# Patient Record
Sex: FEMALE | Race: WHITE | NOT HISPANIC OR LATINO
[De-identification: names, ages, dates, MRNs, and addresses within clinical notes are randomized per-mention and may not be internally consistent; named-entity substitution may affect disease eponyms.]

---

## 2020-11-11 ENCOUNTER — TRANSCRIPTION ENCOUNTER (OUTPATIENT)
Age: 55
End: 2020-11-11

## 2023-11-20 PROBLEM — Z00.00 ENCOUNTER FOR PREVENTIVE HEALTH EXAMINATION: Status: ACTIVE | Noted: 2023-11-20

## 2023-11-27 ENCOUNTER — APPOINTMENT (OUTPATIENT)
Dept: ORTHOPEDIC SURGERY | Facility: CLINIC | Age: 58
End: 2023-11-27
Payer: COMMERCIAL

## 2023-11-27 VITALS — WEIGHT: 145 LBS | BODY MASS INDEX: 20.76 KG/M2 | HEIGHT: 70 IN

## 2023-11-27 DIAGNOSIS — F32.A ANXIETY DISORDER, UNSPECIFIED: ICD-10-CM

## 2023-11-27 DIAGNOSIS — F41.9 ANXIETY DISORDER, UNSPECIFIED: ICD-10-CM

## 2023-11-27 DIAGNOSIS — R93.7 ABNORMAL FINDINGS ON DIAGNOSTIC IMAGING OF OTHER PARTS OF MUSCULOSKELETAL SYSTEM: ICD-10-CM

## 2023-11-27 DIAGNOSIS — F17.200 NICOTINE DEPENDENCE, UNSPECIFIED, UNCOMPLICATED: ICD-10-CM

## 2023-11-27 DIAGNOSIS — E78.00 PURE HYPERCHOLESTEROLEMIA, UNSPECIFIED: ICD-10-CM

## 2023-11-27 PROCEDURE — 99204 OFFICE O/P NEW MOD 45 MIN: CPT

## 2023-11-27 RX ORDER — ESCITALOPRAM OXALATE 5 MG/1
TABLET, FILM COATED ORAL
Refills: 0 | Status: ACTIVE | COMMUNITY

## 2023-11-27 RX ORDER — METHYLPREDNISOLONE 4 MG/1
4 TABLET ORAL
Qty: 1 | Refills: 0 | Status: ACTIVE | COMMUNITY
Start: 2023-11-27 | End: 1900-01-01

## 2023-11-27 RX ORDER — BUPROPION HYDROCHLORIDE 100 MG/1
TABLET, FILM COATED ORAL
Refills: 0 | Status: ACTIVE | COMMUNITY

## 2023-11-27 RX ORDER — ROSUVASTATIN CALCIUM 5 MG/1
TABLET, FILM COATED ORAL
Refills: 0 | Status: ACTIVE | COMMUNITY

## 2024-01-08 ENCOUNTER — APPOINTMENT (OUTPATIENT)
Dept: ORTHOPEDIC SURGERY | Facility: CLINIC | Age: 59
End: 2024-01-08
Payer: COMMERCIAL

## 2024-01-08 PROCEDURE — 99214 OFFICE O/P EST MOD 30 MIN: CPT

## 2024-02-14 ENCOUNTER — OFFICE (OUTPATIENT)
Dept: URBAN - METROPOLITAN AREA CLINIC 8 | Facility: CLINIC | Age: 59
Setting detail: OPHTHALMOLOGY
End: 2024-02-14
Payer: COMMERCIAL

## 2024-02-14 DIAGNOSIS — H25.13: ICD-10-CM

## 2024-02-14 PROCEDURE — 99204 OFFICE O/P NEW MOD 45 MIN: CPT | Performed by: OPHTHALMOLOGY

## 2024-02-14 ASSESSMENT — CONFRONTATIONAL VISUAL FIELD TEST (CVF)
OD_FINDINGS: FULL
OS_FINDINGS: FULL

## 2024-02-14 ASSESSMENT — REFRACTION_MANIFEST
OS_VA1: 20/20
OS_ADD: +2.50
OD_CYLINDER: -0.50
OD_SPHERE: -3.00
OD_VA1: 20/50
OD_ADD: +2.50
OS_VA2: 20/25
OD_AXIS: 095
OS_SPHERE: PLANO
OD_VA2: 20/25

## 2024-02-14 ASSESSMENT — REFRACTION_AUTOREFRACTION
OS_SPHERE: +0.50
OD_AXIS: 097
OD_SPHERE: -4.25
OD_CYLINDER: -0.50
OS_CYLINDER: -0.25
OS_AXIS: 061

## 2024-02-14 ASSESSMENT — REFRACTION_CURRENTRX
OD_SPHERE: +0.25
OS_OVR_VA: 20/
OD_CYLINDER: -1.00
OD_VPRISM_DIRECTION: SV
OD_AXIS: 097
OS_SPHERE: +2.25
OD_OVR_VA: 20/
OS_CYLINDER: SPHERE

## 2024-02-14 ASSESSMENT — SPHEQUIV_DERIVED
OD_SPHEQUIV: -4.5
OD_SPHEQUIV: -3.25
OS_SPHEQUIV: 0.375

## 2024-02-26 ENCOUNTER — APPOINTMENT (OUTPATIENT)
Dept: ORTHOPEDIC SURGERY | Facility: CLINIC | Age: 59
End: 2024-02-26
Payer: COMMERCIAL

## 2024-02-26 DIAGNOSIS — M51.37 OTHER INTERVERTEBRAL DISC DEGENERATION, LUMBOSACRAL REGION: ICD-10-CM

## 2024-02-26 DIAGNOSIS — M54.16 RADICULOPATHY, LUMBAR REGION: ICD-10-CM

## 2024-02-26 DIAGNOSIS — M51.36 OTHER INTERVERTEBRAL DISC DEGENERATION, LUMBAR REGION: ICD-10-CM

## 2024-02-26 DIAGNOSIS — M47.817 SPONDYLOSIS W/OUT MYELOPATHY OR RADICULOPATHY, LUMBOSACRAL REGION: ICD-10-CM

## 2024-02-26 DIAGNOSIS — M48.061 SPINAL STENOSIS, LUMBAR REGION WITHOUT NEUROGENIC CLAUDICATION: ICD-10-CM

## 2024-02-26 PROCEDURE — 99213 OFFICE O/P EST LOW 20 MIN: CPT

## 2024-02-26 NOTE — PHYSICAL EXAM
[TextEntry] : Constitutional: Well groomed and developed.  Respiratory: Normal, unlabored breathing. No use of accessory muscles.  Skin: No rashes or ulcers. Skin warm and dry.  Psychiatric: Oriented to time, place, person and event. No acute distress. Gait: Heel to toe Patient able to walk on toes and heels.    Lumbar spine:  Posture: Normal on coronal and sagittal alignment  AROM:  Flexion 90 Extension 20 Mild pain with simulated truncal motion   Tenderness:  Thoracic: No tenderness on palpation  Lumbar: Mild tenderness on palpation      TTP L4-5 Sacrum/coccyx: no tenderness on palpation  Greater trochanteric bursa:  No tenderness  PSIS: None    Motor:                         R             L LE:                                IS                    5             5 Quad              5             5 TA                  5             5 EHL                5             5 Gastroc         5             5                 R  L DTR: Patella  2+  2+ Achilles  2+  2+  Sensory: Light touch sensation intact T12-S1  Babinski's Sign: Negative bilaterally  Straight leg raise test: Negative bilaterally  ANT test: negative bilaterally

## 2024-02-26 NOTE — ASSESSMENT
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 Patient has TIMMY with Dr. Kwok on 1/18 with good relief. Discussed with patient we may watch and wait to see how long injection lasts for. Continue with home stretching.    - Patient will continue HEP as detailed in home exercise booklet given in office/at PT. Emphasized daily stretching and strengthening.   - Recommend hematology consult for diffuse marrow edema  - Recommend NSAIDs PRN - Recommend heating pad use to decrease muscle spasm - Discussed the importance of home exercises, including but not limited to hamstring stretching and core strengthening   Patient was educated on their diagnosis today. All questions answered and patient expressed understanding.   Follow up PRN

## 2024-02-26 NOTE — HISTORY OF PRESENT ILLNESS
[Lower back] : lower back [Sudden] : sudden [5] : 5 [3] : 3 [Dull/Aching] : dull/aching [Sharp] : sharp [Tightness] : tightness [Squeezing] : squeezing [Constant] : constant [Meds] : meds [Bending forward] : bending forward [de-identified] : Follow up lumbar spine. Patient had TIMMY on 1/18/24 with Dr. Kwok and feels the severity of her pain has decreased. Patient admits to going to PT until her insurance cut her off. Patient admits to working with a . Patient admits to D/C Gabapentin. Patient admits to taking Advil for pain.  Today's Pain 3/10 [FreeTextEntry7] : into the right hip, groin, and RLQ [] : no [de-identified] : raising her right leg

## 2024-03-18 ENCOUNTER — OFFICE (OUTPATIENT)
Dept: URBAN - METROPOLITAN AREA CLINIC 38 | Facility: CLINIC | Age: 59
Setting detail: OPHTHALMOLOGY
End: 2024-03-18
Payer: COMMERCIAL

## 2024-03-18 DIAGNOSIS — H25.13: ICD-10-CM

## 2024-03-18 DIAGNOSIS — H25.11: ICD-10-CM

## 2024-03-18 PROCEDURE — 99213 OFFICE O/P EST LOW 20 MIN: CPT | Performed by: OPHTHALMOLOGY

## 2024-03-18 PROCEDURE — 92136 OPHTHALMIC BIOMETRY: CPT | Performed by: OPHTHALMOLOGY

## 2024-03-18 ASSESSMENT — REFRACTION_MANIFEST
OD_VA2: 20/25
OD_CYLINDER: -0.50
OD_VA1: 20/50
OS_ADD: +2.50
OD_ADD: +2.50
OS_SPHERE: PLANO
OD_AXIS: 095
OD_SPHERE: -3.00
OS_VA1: 20/20
OS_VA2: 20/25

## 2024-03-18 ASSESSMENT — REFRACTION_CURRENTRX
OS_SPHERE: +2.25
OS_OVR_VA: 20/
OD_OVR_VA: 20/
OD_AXIS: 097
OD_CYLINDER: -1.00
OD_SPHERE: +0.25
OS_CYLINDER: SPHERE
OD_VPRISM_DIRECTION: SV

## 2024-03-18 ASSESSMENT — SPHEQUIV_DERIVED: OD_SPHEQUIV: -3.25

## 2024-04-02 ENCOUNTER — OUTPATIENT HOSPITAL (OUTPATIENT)
Dept: URBAN - METROPOLITAN AREA CLINIC 7 | Facility: CLINIC | Age: 59
Setting detail: OPHTHALMOLOGY
End: 2024-04-02
Payer: COMMERCIAL

## 2024-04-02 DIAGNOSIS — H25.11: ICD-10-CM

## 2024-04-02 PROCEDURE — 66984 XCAPSL CTRC RMVL W/O ECP: CPT | Mod: RT | Performed by: OPHTHALMOLOGY

## 2024-04-03 ENCOUNTER — OFFICE (OUTPATIENT)
Dept: URBAN - METROPOLITAN AREA CLINIC 8 | Facility: CLINIC | Age: 59
Setting detail: OPHTHALMOLOGY
End: 2024-04-03
Payer: COMMERCIAL

## 2024-04-03 ENCOUNTER — RX ONLY (RX ONLY)
Age: 59
End: 2024-04-03

## 2024-04-03 DIAGNOSIS — Z96.1: ICD-10-CM

## 2024-04-03 PROCEDURE — 99024 POSTOP FOLLOW-UP VISIT: CPT | Performed by: OPHTHALMOLOGY

## 2024-05-20 ENCOUNTER — OFFICE (OUTPATIENT)
Dept: URBAN - METROPOLITAN AREA CLINIC 8 | Facility: CLINIC | Age: 59
Setting detail: OPHTHALMOLOGY
End: 2024-05-20
Payer: COMMERCIAL

## 2024-05-20 DIAGNOSIS — H25.12: ICD-10-CM

## 2024-05-20 DIAGNOSIS — Z96.1: ICD-10-CM

## 2024-05-20 PROCEDURE — 99024 POSTOP FOLLOW-UP VISIT: CPT | Performed by: OPHTHALMOLOGY

## 2024-05-20 ASSESSMENT — CONFRONTATIONAL VISUAL FIELD TEST (CVF)
OD_FINDINGS: FULL
OS_FINDINGS: FULL

## 2024-06-10 ENCOUNTER — RX ONLY (RX ONLY)
Age: 59
End: 2024-06-10

## 2024-06-10 ENCOUNTER — OFFICE (OUTPATIENT)
Dept: URBAN - METROPOLITAN AREA CLINIC 8 | Facility: CLINIC | Age: 59
Setting detail: OPHTHALMOLOGY
End: 2024-06-10
Payer: COMMERCIAL

## 2024-06-10 DIAGNOSIS — H25.12: ICD-10-CM

## 2024-06-10 PROCEDURE — 92136 OPHTHALMIC BIOMETRY: CPT | Performed by: OPHTHALMOLOGY

## 2024-06-10 ASSESSMENT — CONFRONTATIONAL VISUAL FIELD TEST (CVF)
OD_FINDINGS: FULL
OS_FINDINGS: FULL

## 2024-06-18 ENCOUNTER — OUTPATIENT HOSPITAL (OUTPATIENT)
Dept: URBAN - METROPOLITAN AREA CLINIC 7 | Facility: CLINIC | Age: 59
Setting detail: OPHTHALMOLOGY
End: 2024-06-18
Payer: COMMERCIAL

## 2024-06-18 DIAGNOSIS — H25.12: ICD-10-CM

## 2024-06-18 PROCEDURE — 66984 XCAPSL CTRC RMVL W/O ECP: CPT | Mod: 79,LT | Performed by: OPHTHALMOLOGY

## 2024-06-19 ENCOUNTER — RX ONLY (RX ONLY)
Age: 59
End: 2024-06-19

## 2024-06-19 ENCOUNTER — OFFICE (OUTPATIENT)
Dept: URBAN - METROPOLITAN AREA CLINIC 8 | Facility: CLINIC | Age: 59
Setting detail: OPHTHALMOLOGY
End: 2024-06-19
Payer: COMMERCIAL

## 2024-06-19 DIAGNOSIS — Z96.1: ICD-10-CM

## 2024-06-19 PROCEDURE — 99024 POSTOP FOLLOW-UP VISIT: CPT | Performed by: OPHTHALMOLOGY

## 2024-06-19 ASSESSMENT — CONFRONTATIONAL VISUAL FIELD TEST (CVF)
OD_FINDINGS: FULL
OS_FINDINGS: FULL

## 2024-07-23 ENCOUNTER — OFFICE (OUTPATIENT)
Dept: URBAN - METROPOLITAN AREA CLINIC 38 | Facility: CLINIC | Age: 59
Setting detail: OPHTHALMOLOGY
End: 2024-07-23
Payer: COMMERCIAL

## 2024-07-23 DIAGNOSIS — H02.011: ICD-10-CM

## 2024-07-23 DIAGNOSIS — Z96.1: ICD-10-CM

## 2024-07-23 DIAGNOSIS — H43.393: ICD-10-CM

## 2024-07-23 PROCEDURE — 67820 REVISE EYELASHES: CPT | Mod: 79,E3 | Performed by: OPTOMETRIST

## 2024-07-23 PROCEDURE — 99024 POSTOP FOLLOW-UP VISIT: CPT | Mod: 24 | Performed by: OPTOMETRIST

## 2024-07-23 ASSESSMENT — LID EXAM ASSESSMENTS
OD_TRICHIASIS: RUL T
OD_COMMENTS: 1 LASH EPILATED AT SLIT LAMP WITH JEWELER&APOS

## 2024-07-23 ASSESSMENT — CONFRONTATIONAL VISUAL FIELD TEST (CVF)
OS_FINDINGS: FULL
OD_FINDINGS: FULL

## 2024-09-05 ENCOUNTER — OFFICE (OUTPATIENT)
Dept: URBAN - METROPOLITAN AREA CLINIC 38 | Facility: CLINIC | Age: 59
Setting detail: OPHTHALMOLOGY
End: 2024-09-05
Payer: COMMERCIAL

## 2024-09-05 DIAGNOSIS — Z96.1: ICD-10-CM

## 2024-09-05 DIAGNOSIS — H02.012: ICD-10-CM

## 2024-09-05 DIAGNOSIS — H02.011: ICD-10-CM

## 2024-09-05 DIAGNOSIS — H16.223: ICD-10-CM

## 2024-09-05 DIAGNOSIS — H02.015: ICD-10-CM

## 2024-09-05 PROCEDURE — 99024 POSTOP FOLLOW-UP VISIT: CPT | Performed by: OPTOMETRIST

## 2024-09-05 ASSESSMENT — CONFRONTATIONAL VISUAL FIELD TEST (CVF)
OD_FINDINGS: FULL
OS_FINDINGS: FULL

## 2024-09-05 ASSESSMENT — LID EXAM ASSESSMENTS
OS_TRICHIASIS: LLL 1+
OD_TRICHIASIS: RLL RUL T 1+

## 2024-12-19 ENCOUNTER — OFFICE (OUTPATIENT)
Dept: URBAN - METROPOLITAN AREA CLINIC 38 | Facility: CLINIC | Age: 59
Setting detail: OPHTHALMOLOGY
End: 2024-12-19
Payer: COMMERCIAL

## 2024-12-19 DIAGNOSIS — H02.012: ICD-10-CM

## 2024-12-19 DIAGNOSIS — H52.4: ICD-10-CM

## 2024-12-19 DIAGNOSIS — H26.491: ICD-10-CM

## 2024-12-19 DIAGNOSIS — H02.015: ICD-10-CM

## 2024-12-19 DIAGNOSIS — Z96.1: ICD-10-CM

## 2024-12-19 DIAGNOSIS — H16.223: ICD-10-CM

## 2024-12-19 DIAGNOSIS — H02.011: ICD-10-CM

## 2024-12-19 PROCEDURE — 92014 COMPRE OPH EXAM EST PT 1/>: CPT | Performed by: OPHTHALMOLOGY

## 2024-12-19 PROCEDURE — 92015 DETERMINE REFRACTIVE STATE: CPT | Performed by: OPHTHALMOLOGY

## 2024-12-19 ASSESSMENT — TEAR BREAK UP TIME (TBUT)
OS_TBUT: 1+
OD_TBUT: 1+

## 2024-12-19 ASSESSMENT — REFRACTION_MANIFEST
OD_SPHERE: +0.25
OU_VA: 20/20
OD_VA2: 20/25
OS_CYLINDER: -0.75
OD_VA1: 20/20
OD_CYLINDER: SPH
OU_VA: 20/20
OS_SPHERE: -0.25
OS_ADD: +1.50
OD_CYLINDER: -0.50
OD_VA1: 20/20
OS_SPHERE: PLANO
OS_AXIS: 060
OS_VA1: 20/20
OS_ADD: +2.25
OS_CYLINDER: SPH
OS_VA1: 20/20
OD_ADD: +2.25
OD_ADD: +1.50
OD_AXIS: 145
OS_VA2: 20/25
OD_SPHERE: +0.50

## 2024-12-19 ASSESSMENT — REFRACTION_CURRENTRX
OS_VPRISM_DIRECTION: SV
OD_CYLINDER: -0.25
OD_OVR_VA: 20/
OS_OVR_VA: 20/
OS_AXIS: 093
OS_SPHERE: +2.00
OD_AXIS: 168
OS_VPRISM_DIRECTION: SV
OD_SPHERE: +2.25
OD_VPRISM_DIRECTION: SV
OS_AXIS: 086
OD_AXIS: 170
OD_SPHERE: +2.25
OS_CYLINDER: -0.75
OS_SPHERE: +2.00
OD_VPRISM_DIRECTION: SV
OS_OVR_VA: 20/
OD_OVR_VA: 20/
OS_CYLINDER: -0.75
OD_CYLINDER: -0.25

## 2024-12-19 ASSESSMENT — KERATOMETRY
OS_K2POWER_DIOPTERS: 43.50
METHOD_AUTO_MANUAL: AUTO
OS_K1POWER_DIOPTERS: 42.75
OD_K1POWER_DIOPTERS: 42.50
OD_K2POWER_DIOPTERS: 43.50
OS_AXISANGLE_DEGREES: 106
OD_AXISANGLE_DEGREES: 167

## 2024-12-19 ASSESSMENT — CONFRONTATIONAL VISUAL FIELD TEST (CVF)
OS_FINDINGS: FULL
OD_FINDINGS: FULL

## 2024-12-19 ASSESSMENT — LID EXAM ASSESSMENTS
OD_TRICHIASIS: RLL RUL T 1+
OS_TRICHIASIS: LLL 1+

## 2024-12-19 ASSESSMENT — REFRACTION_AUTOREFRACTION
OD_SPHERE: +0.50
OD_CYLINDER: -0.75
OD_AXIS: 143
OS_AXIS: 058
OS_CYLINDER: -0.50
OS_SPHERE: -0.25

## 2024-12-19 ASSESSMENT — VISUAL ACUITY
OD_BCVA: 20/20-
OS_BCVA: 20/20

## 2025-04-09 ENCOUNTER — TRANSCRIPTION ENCOUNTER (OUTPATIENT)
Age: 60
End: 2025-04-09

## 2025-06-16 ENCOUNTER — TRANSCRIPTION ENCOUNTER (OUTPATIENT)
Age: 60
End: 2025-06-16